# Patient Record
Sex: FEMALE | Race: WHITE
[De-identification: names, ages, dates, MRNs, and addresses within clinical notes are randomized per-mention and may not be internally consistent; named-entity substitution may affect disease eponyms.]

---

## 2018-08-06 ENCOUNTER — HOSPITAL ENCOUNTER (OUTPATIENT)
Dept: HOSPITAL 92 - BICULT | Age: 63
Discharge: HOME | End: 2018-08-06
Attending: INTERNAL MEDICINE
Payer: MEDICARE

## 2018-08-06 DIAGNOSIS — K74.60: Primary | ICD-10-CM

## 2018-08-06 DIAGNOSIS — R16.0: ICD-10-CM

## 2018-08-06 PROCEDURE — 76705 ECHO EXAM OF ABDOMEN: CPT

## 2019-04-15 ENCOUNTER — HOSPITAL ENCOUNTER (OUTPATIENT)
Dept: HOSPITAL 92 - BICULT | Age: 64
Discharge: HOME | End: 2019-04-15
Attending: INTERNAL MEDICINE
Payer: MEDICARE

## 2019-04-15 DIAGNOSIS — K74.60: ICD-10-CM

## 2019-04-15 DIAGNOSIS — Z91.89: ICD-10-CM

## 2019-04-15 DIAGNOSIS — Z86.010: ICD-10-CM

## 2019-04-15 DIAGNOSIS — K76.0: ICD-10-CM

## 2019-04-15 DIAGNOSIS — D13.2: Primary | ICD-10-CM

## 2019-04-15 DIAGNOSIS — R93.2: ICD-10-CM

## 2019-04-15 DIAGNOSIS — Z90.49: ICD-10-CM

## 2019-04-15 PROCEDURE — 76705 ECHO EXAM OF ABDOMEN: CPT

## 2019-04-15 NOTE — ULT
HEPATIC ULTRASOUND WITH DOPPLER:

 

HISTORY:

Hepatitis.

 

COMPARISON:

08/06/2018

 

FINDINGS:

The liver is echogenic and measures 20 cm in length.  No focal mass or intrahepatic ductal dilatation
 is seen.

 

The spleen measures 12.7 cm in length.  The patient is post cholecystectomy.  The pancreas is obscure
d by overlying bowel gas.  The common duct measures 6 mm in diameter.  No free fluid is seen.

 

There is normal flow and spectral waveforms in the hepatic, portal, and splenic vasculature.

 

The exam is somewhat limited due to body habitus.

 

IMPRESSION:

1.  Increased echogenicity of the liver, consistent with hepatocellular disease.  No evidence of hepa
tic mass.

 

2.  Status post cholecystectomy without high-grade biliary obstruction.

 

POS: [facility]

## 2019-04-24 ENCOUNTER — HOSPITAL ENCOUNTER (OUTPATIENT)
Dept: HOSPITAL 92 - SDC | Age: 64
Discharge: HOME | End: 2019-04-24
Attending: INTERNAL MEDICINE
Payer: MEDICARE

## 2019-04-24 VITALS — BODY MASS INDEX: 49.9 KG/M2

## 2019-04-24 DIAGNOSIS — E11.9: ICD-10-CM

## 2019-04-24 DIAGNOSIS — Z98.890: ICD-10-CM

## 2019-04-24 DIAGNOSIS — K21.9: ICD-10-CM

## 2019-04-24 DIAGNOSIS — D13.2: ICD-10-CM

## 2019-04-24 DIAGNOSIS — D12.2: ICD-10-CM

## 2019-04-24 DIAGNOSIS — E66.01: ICD-10-CM

## 2019-04-24 DIAGNOSIS — J45.909: ICD-10-CM

## 2019-04-24 DIAGNOSIS — Z79.899: ICD-10-CM

## 2019-04-24 DIAGNOSIS — F41.9: ICD-10-CM

## 2019-04-24 DIAGNOSIS — D12.5: ICD-10-CM

## 2019-04-24 DIAGNOSIS — D12.3: ICD-10-CM

## 2019-04-24 DIAGNOSIS — G47.30: ICD-10-CM

## 2019-04-24 DIAGNOSIS — K75.81: ICD-10-CM

## 2019-04-24 DIAGNOSIS — K74.60: ICD-10-CM

## 2019-04-24 DIAGNOSIS — Z91.11: ICD-10-CM

## 2019-04-24 DIAGNOSIS — Z12.11: Primary | ICD-10-CM

## 2019-04-24 DIAGNOSIS — D12.8: ICD-10-CM

## 2019-04-24 DIAGNOSIS — Z86.010: ICD-10-CM

## 2019-04-24 DIAGNOSIS — E07.9: ICD-10-CM

## 2019-04-24 DIAGNOSIS — K64.8: ICD-10-CM

## 2019-04-24 DIAGNOSIS — Z91.14: ICD-10-CM

## 2019-04-24 DIAGNOSIS — F32.9: ICD-10-CM

## 2019-04-24 DIAGNOSIS — Z79.4: ICD-10-CM

## 2019-04-24 DIAGNOSIS — K43.9: ICD-10-CM

## 2019-04-24 PROCEDURE — 0DBL8ZX EXCISION OF TRANSVERSE COLON, VIA NATURAL OR ARTIFICIAL OPENING ENDOSCOPIC, DIAGNOSTIC: ICD-10-PCS | Performed by: INTERNAL MEDICINE

## 2019-04-24 PROCEDURE — 88305 TISSUE EXAM BY PATHOLOGIST: CPT

## 2019-04-24 PROCEDURE — 0DBP8ZX EXCISION OF RECTUM, VIA NATURAL OR ARTIFICIAL OPENING ENDOSCOPIC, DIAGNOSTIC: ICD-10-PCS | Performed by: INTERNAL MEDICINE

## 2019-04-24 PROCEDURE — 0DB98ZX EXCISION OF DUODENUM, VIA NATURAL OR ARTIFICIAL OPENING ENDOSCOPIC, DIAGNOSTIC: ICD-10-PCS | Performed by: INTERNAL MEDICINE

## 2019-04-24 PROCEDURE — 0DBK8ZX EXCISION OF ASCENDING COLON, VIA NATURAL OR ARTIFICIAL OPENING ENDOSCOPIC, DIAGNOSTIC: ICD-10-PCS | Performed by: INTERNAL MEDICINE

## 2019-04-24 PROCEDURE — 36416 COLLJ CAPILLARY BLOOD SPEC: CPT

## 2019-04-24 PROCEDURE — 0DBN8ZX EXCISION OF SIGMOID COLON, VIA NATURAL OR ARTIFICIAL OPENING ENDOSCOPIC, DIAGNOSTIC: ICD-10-PCS | Performed by: INTERNAL MEDICINE

## 2019-04-24 NOTE — OP
DATE OF PROCEDURE:  04/24/2019



PROCEDURES PERFORMED:  Esophagogastroduodenoscopy with biopsy and colonoscopy with

polypectomy. 



PREPROCEDURE DIAGNOSES:  

1. 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

2. 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

by Dr. Olman Sandoval in [place] in distant past.  No recent followup. 

3. Prior history of colon polyps.

4. Large abdominal hernias.

5. Morbid obesity.



ANESTHESIA:  TIVA.



POSTPROCEDURE DIAGNOSES:  

1. 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

Visualization very difficult secondary to body habitus, persistent coughing,

retching, and difficulty with airway management per Anesthesia.  Multiple biopsies

were taken from the anastomosis. 

2. Otherwise normal esophagogastroduodenoscopy.

3. Colonoscopy notable for 4 polyps from 2 to 5 mm in size in the transverse colon

and ascending colon, removed by snare polypectomy.  Sigmoid colon was notable for 2

polyps, one 5 mm and one 10 mm, both removed by snare polypectomy.  Both had

postpolypectomy bleeding and were treated with hemoclips, 3 were used. 

4. Rectal polyp, frondlike, removed by snare polypectomy.

5. Postoperative changes in the stomach, that is previous gastric band without

complications seen. 



COMPLICATIONS:  None.  Airway was managed definitely by Anesthesia.  For details,

see their records.  There were no desaturations.  The procedure was very difficult

secondary to the patient's body habitus. 



RECOMMENDATIONS:  

1. Await pathology.  Repeat colonoscopy in 3 years.

2. Follow up in the office in 2 weeks.



DESCRIPTION OF PROCEDURE:  The patient was informed of the risks, benefits, and

possible complications of endoscopy including perforation, reaction to medication,

and aspiration, informed consent was obtained.  The patient was brought to the

endoscopy suite, where she was sedated in gradual fashion.  Once she was

comfortable, a bite block was placed inside the orifice.  The endoscope was advanced

through the esophagus, stomach, and the second and third portion of the duodenum.

The esophagus was normal.  The stomach was normal in forward and retroflexed views.

The duodenal bulb was normal.  However, just outside of the duodenal bulb, there was

an end-to-side anastomosis for the duodenum.  Distal of this, the duodenum appeared

normal.  Proximal to it, end anastomosis itself appeared somewhat atypical, but the

area was very difficult to visualize and keep clear.  There was bile reflux and the

patient had continuous retching and coughing throughout the procedure.  We biopsied

this area vigorously as there was a history of polypoid tissue in the area that had

to be ablated previously.  There was no hard or firm tissue to indicate malignancy.

There were some erosions.  The stomach retroflexed views were normal except for

evidence of previous bariatric surgery probably a band.  The band was not seen and

there was no evidence of obstruction. 



The patient was turned to the room after the EGD and a rectal examination was

performed.  The endoscope was advanced to the anal canal through the colon to the

cecum, which was identified by ileocecal valve and appendiceal orifice.  The exam

was very difficult as she had a large abdominal wall hernia with colon above that

hernia and we had to maneuver in and out of the hernia sac with manual pressure.

Once we were able to reach the cecum, the prep was pretty good.  We irrigated with

about 200 mL of sterile water.  We slowly came back and removed 8 polyp as noted

above.  From the transverse and ascending colon between 3 and 5 mm in size with hot

snare polypectomy with good hemostasis.  Other 2 in the sigmoid, 1 about 4 mm and 1

about 10 mm in size, these were removed by hot snare polypectomy and both had

bleeding, which had to be controlled with hemoclip placement.  There was 1 rectal

polyp that was 

frondlike and may represent condyloma, although it did not appear hard or firm,

removed by snare polypectomy.  Retroflexed view showed internal hemorrhoids.  The

scope was removed.  The patient tolerated the procedure well.  There were no

complications. 







Job ID:  [id number]